# Patient Record
Sex: MALE | Race: WHITE
[De-identification: names, ages, dates, MRNs, and addresses within clinical notes are randomized per-mention and may not be internally consistent; named-entity substitution may affect disease eponyms.]

---

## 2018-10-25 ENCOUNTER — HOSPITAL ENCOUNTER (OUTPATIENT)
Dept: HOSPITAL 47 - RADCTMAIN | Age: 63
Discharge: HOME | End: 2018-10-25
Attending: NURSE PRACTITIONER
Payer: OTHER GOVERNMENT

## 2018-10-25 DIAGNOSIS — I71.2: Primary | ICD-10-CM

## 2018-10-25 LAB — BUN SERPL-SCNC: 15 MG/DL (ref 9–20)

## 2018-10-25 PROCEDURE — 36415 COLL VENOUS BLD VENIPUNCTURE: CPT

## 2018-10-25 PROCEDURE — 84520 ASSAY OF UREA NITROGEN: CPT

## 2018-10-25 PROCEDURE — 71275 CT ANGIOGRAPHY CHEST: CPT

## 2018-10-25 PROCEDURE — 82565 ASSAY OF CREATININE: CPT

## 2018-10-25 NOTE — CT
EXAMINATION TYPE: CT angio chest

 

DATE OF EXAM: 10/25/2018

 

COMPARISON: None

 

HISTORY: Thoracic aortic aneurysm.

 

CT DLP: 268 mGycm

 

CONTRAST: 

CTA thoracic aorta with 3-D reconstruction is performed and with IV Contrast, patient injected with 1
00 mL of Isovue 370.

 

Contrast CTA of the thoracic aorta was performed from the lung apex through the upper abdomen.  3D re
construction imaging obtained at a separate workstation.  

 

CT Chest:

 

THORACIC AORTA: No evidence for thoracic aortic aneurysm. Mild atheromatous changes seen.  There is n
o evidence for dissection or periaortic collection.  

 

LUNGS: The lungs are clear and free of infiltrate or atelectasis.  No pulmonary nodule or mass is det
ected.  No pleural effusion or CT evidence of interstitial lung disease.

 

MEDIASTINUM:   No evidence for mediastinal hematoma.  The heart is not enlarged.  No evidence for med
iastinal mass or adenopathy.

 

HILAR STRUCTURES: No evidence for mass.  No hilar adenopathy is appreciated.

 

OTHER: No significant abnormality. 

 

IMPRESSION-

 

1. No evidence for thoracic aortic.

## 2020-01-07 ENCOUNTER — HOSPITAL ENCOUNTER (OUTPATIENT)
Dept: HOSPITAL 47 - RADCTMAIN | Age: 65
Discharge: HOME | End: 2020-01-07
Attending: CLINIC/CENTER
Payer: OTHER GOVERNMENT

## 2020-01-07 DIAGNOSIS — Z12.2: Primary | ICD-10-CM

## 2020-01-07 DIAGNOSIS — F17.210: ICD-10-CM

## 2020-01-07 NOTE — CTL
EXAMINATION TYPE:  CT Low Dose Lung

 

DATE OF EXAM ORDERED: 1/7/2020

 

HISTORY: . Lung cancer screening

 

CT DLP: 100 mGycm

CT CTDI: 2.9 mGy

Automated exposure control for dose reduction was used.

 

SCREENING VISIT: Initial

 

COMPARISON: CTA 10/25/2018

 

TECHNIQUE: Low dose computed tomography scan was performed through the chest at 1 mm thick sections a
nd reconstructed images in the coronal plane at 1 mm thick sections.

 

CT DIAGNOSTIC QUALITY: Satisfactory

 

FINDINGS:

LUNG NODULES: None.

 

LUNGS:

COPD: Severity: None

Fibrosis: Severity: None

Lymph nodes: None

Other findings: None

 

RIGHT PLEURAL SPACE:

Effusion: None

Calcification: None

Thickening: None

Pneumothorax: None

 

LEFT PLEURAL SPACE:

Effusion: None

Calcification: None

Thickening: None

Pneumothorax: None

 

HEART:  

Heart Size: Normal

Coronary calcification: Mild

Pericardial effusion: None

 

OTHER FINDINGS:

Upper abdomen: Postsurgical changes right nephrectomy.

Bony thorax: Normal

Supraclavicular region: Normal

Other: Ascending thoracic aorta at the level the main pulmonary artery measures 3.7 cm.  The main pul
monary artery at the bifurcation measures 2.6 cm.

 

IMPRESSION: 

1. Negative CT chest

 

FOLLOW UP CT CHEST RECOMMENDATION: Low dose screening in 1 year per protocol

CT LUNG RAD: 1

## 2021-04-13 ENCOUNTER — HOSPITAL ENCOUNTER (EMERGENCY)
Dept: HOSPITAL 47 - EC | Age: 66
Discharge: HOME | End: 2021-04-13
Payer: OTHER GOVERNMENT

## 2021-04-13 VITALS — HEART RATE: 79 BPM | RESPIRATION RATE: 18 BRPM | SYSTOLIC BLOOD PRESSURE: 141 MMHG | DIASTOLIC BLOOD PRESSURE: 79 MMHG

## 2021-04-13 VITALS — TEMPERATURE: 97.8 F

## 2021-04-13 DIAGNOSIS — E78.5: ICD-10-CM

## 2021-04-13 DIAGNOSIS — L03.211: ICD-10-CM

## 2021-04-13 DIAGNOSIS — I10: ICD-10-CM

## 2021-04-13 DIAGNOSIS — F17.200: ICD-10-CM

## 2021-04-13 DIAGNOSIS — K04.7: Primary | ICD-10-CM

## 2021-04-13 PROCEDURE — 99283 EMERGENCY DEPT VISIT LOW MDM: CPT

## 2021-04-13 NOTE — ED
General Adult HPI





- General


Chief complaint: Skin/Abscess/Foreign Body


Stated complaint: R side cheek pus inside mouth


Time Seen by Provider: 04/13/21 16:43


Source: patient, RN notes reviewed, old records reviewed


Mode of arrival: ambulatory


Limitations: no limitations





- History of Present Illness


Initial comments: 





66-year-old male with right facial swelling, tooth pain.  Symptoms haven't 

present for 2 days.  Patient has no systemic symptoms.  No fever.  He's had a 

similar episode to this in the past which responded well to antibiotics.  Is 

able to eat and drink.  He did report some purulent drainage from his right 

upper molar.  Patient denies difficulty breathing or difficulty eating.





- Related Data


                                  Previous Rx's











 Medication  Instructions  Recorded


 


Amoxicillin/Potassium Clav 1 tab PO Q12HR 14 Days #28 tab 04/13/21





[Augmentin 875-125 Tablet]  











                                    Allergies











Allergy/AdvReac Type Severity Reaction Status Date / Time


 


hydromorphone [From Dilaudid] Allergy  Anaphylaxis Verified 04/13/21 13:41














Review of Systems


ROS Statement: 


Those systems with pertinent positive or pertinent negative responses have been 

documented in the HPI.





ROS Other: All systems not noted in ROS Statement are negative.





Past Medical History


Past Medical History: Hyperlipidemia, Hypertension, Renal Disease


Additional Past Medical History / Comment(s): chronic neck and back pain


History of Any Multi-Drug Resistant Organisms: None Reported


Additional Past Surgical History / Comment(s): kidney removed


Past Psychological History: No Psychological Hx Reported


Smoking Status: Current every day smoker


Past Alcohol Use History: Occasional


Past Drug Use History: None Reported





General Exam


Limitations: no limitations


General appearance: alert, in no apparent distress


Head exam: Present: atraumatic, normocephalic


Eye exam: Present: normal appearance, PERRL


ENT exam: Present: other (Minimal right upper or facial swelling.  No fluctuance

or induration.  There is a molar on the upper right with a abscess which is 

freely draining.  There is tenderness to percussion over this tooth.)


Neck exam: Present: normal inspection.  Absent: tenderness, meningismus


Respiratory exam: Present: normal lung sounds bilaterally.  Absent: respiratory 

distress


Cardiovascular Exam: Present: regular rate, normal rhythm


GI/Abdominal exam: Present: soft.  Absent: distended, tenderness


Extremities exam: Present: normal inspection, normal capillary refill.  Absent: 

pedal edema


Neurological exam: Present: alert, oriented X3, CN II-XII intact.  Absent: motor

sensory deficit


Skin exam: Present: warm, dry, intact.  Absent: cyanosis, diaphoretic





Course





                                   Vital Signs











  04/13/21





  13:37


 


Temperature 97.8 F


 


Pulse Rate 91


 


Respiratory 20





Rate 


 


Blood Pressure 150/98


 


O2 Sat by Pulse 96





Oximetry 














Medical Decision Making





- Medical Decision Making





66-year-old male with right upper molar tooth abscess, facial swelling.  Patient

is well-appearing, no trismus.  No fever.  He does have a freely draining apical

abscess in the right upper molar.  There is some facial cellulitis, no drainable

abscess within the soft tissue of the cheek itself.  Patient started on 

Augmentin and he will follow-up with his dentist.  He is given strict return 

parameters.





Disposition


Clinical Impression: 


 Abscessed tooth, Facial cellulitis





Disposition: HOME SELF-CARE


Condition: Good


Instructions (If sedation given, give patient instructions):  Dental Abscess 

(ED)


Additional Instructions: 


Please return with development of fever, worsening symptoms.  Please follow up 

with your dentist.


Prescriptions: 


Amoxicillin/Potassium Clav [Augmentin 875-125 Tablet] 1 tab PO Q12HR 14 Days #28

tab


Is patient prescribed a controlled substance at d/c from ED?: No


Referrals: 


[facility] [Primary Care Provider] - 1-2 days


Time of Disposition: 16:53

## 2021-08-06 ENCOUNTER — HOSPITAL ENCOUNTER (OUTPATIENT)
Dept: HOSPITAL 47 - RADCTMAIN | Age: 66
Discharge: HOME | End: 2021-08-06
Attending: CLINIC/CENTER
Payer: OTHER GOVERNMENT

## 2021-08-06 DIAGNOSIS — J43.9: ICD-10-CM

## 2021-08-06 DIAGNOSIS — J44.9: ICD-10-CM

## 2021-08-06 DIAGNOSIS — Z12.2: Primary | ICD-10-CM

## 2021-08-06 PROCEDURE — 71271 CT THORAX LUNG CANCER SCR C-: CPT

## 2021-08-06 NOTE — CTL
EXAMINATION TYPE:  CT Low Dose Lung

 

DATE OF EXAM ORDERED: 8/6/2021

 

HISTORY: 66-year-old male Personal history of tobacco use. Lung cancer screening

 

CT DLP: 101.4 mGycm

CT CTDI: 2.8 mGy

Automated exposure control for dose reduction was used.

 

SCREENING VISIT: One and a half years from prior screening

 

COMPARISON: 1/7/2020

 

TECHNIQUE: Low dose computed tomography scan was performed through the chest with coronal and sagitta
l reconstructions.

 

CT DIAGNOSTIC QUALITY: Satisfactory

 

FINDINGS:

Heart normal size without pericardial effusion. Scattered three-vessel coronary calcifications are pr
esent.

 

Ascending aorta ectatic and 3.8 cm, unchanged. Bovine configuration to the aortic arch. There is dire
ct takeoff of the left vertebral artery directly from the aortic arch. Ectatic lower descending thora
cic aorta 2.6 cm.

 

Stable scattered nonenlarged mediastinal lymph nodes.

 

Mild centrilobular emphysema.  Mild diffuse bronchial wall thickening. There is strandy atelectasis o
r scarring in the lower lungs. Some scattered mild paraseptal emphysema also noted. No consolidation 
or pleural effusion.

 

Stable 2 mm subpleural pulmonary nodule anterior right upper lobe, axial image 60. 

Some focal endobronchial opacification within the posterior segment right upper lobe bronchus, axial 
image 106 measuring 7 mm.

 

No consolidation or pleural effusion.

 

Surgical clips at the right nephrectomy bed.

 

Bones: Stable mild degenerative disc disease and endplate Schmorl's nodes lower thoracic spine.

 

 

 

 

 

IMPRESSION: 

1. Lungs RADS Category 4A ( Probably suspicious, 5-15% chance of malignancy). New 7 mm endobronchial 
nodule posterior segment right upper lobe bronchus. This may represent an area of mucous plugging. Sh
ort interval follow-up recommended to reassess and exclude a small early endobronchial lesion.

 

2. COPD with mild emphysema.

 

 

 

CT LUNG RAD AND CT CHEST RECOMMENDATION: Lung-Rad 4A Suspicious: Follow-up 3 month LDCT or PET/CT may
 be used when there is a > 8 mm solid component.

## 2021-11-02 ENCOUNTER — HOSPITAL ENCOUNTER (OUTPATIENT)
Dept: HOSPITAL 47 - RADCTMAIN | Age: 66
Discharge: HOME | End: 2021-11-02
Attending: INTERNAL MEDICINE
Payer: OTHER GOVERNMENT

## 2021-11-02 DIAGNOSIS — R91.8: Primary | ICD-10-CM

## 2021-11-02 LAB — BUN SERPL-SCNC: 16 MG/DL (ref 9–20)

## 2021-11-02 PROCEDURE — 36415 COLL VENOUS BLD VENIPUNCTURE: CPT

## 2021-11-02 PROCEDURE — 82565 ASSAY OF CREATININE: CPT

## 2021-11-02 PROCEDURE — 71260 CT THORAX DX C+: CPT

## 2021-11-02 PROCEDURE — 84520 ASSAY OF UREA NITROGEN: CPT

## 2021-11-02 NOTE — CT
EXAMINATION TYPE: CT chest w con

 

DATE OF EXAM: 11/2/2021

 

COMPARISON: Low-dose lung screening CT August 6, 2021 and older CTs

 

HISTORY: Previous abnormal exam lung fields

 

CT DLP: 309.6 mGycm.   Automated Exposure Control for Dose Reduction was Utilized.

 

TECHNIQUE:  CT scan of the thorax is performed following with IV Contrast, patient injected with 100 
ml mL of Isovue 300.  

 

FINDINGS:

 

LUNGS: Mild underlying emphysematous changes redemonstrated.  Interval clearance of 7 mm nodular opac
ity posterior right upper lung bronchus axial image 25 likely reflecting mucous plugging given the in
terval complete resolution. No new greater than 5 mm pulmonary nodules or masses. Mild bibasilar line
ar scarring and/or atelectasis. There is no pleural effusion or pneumothorax seen.  The tracheobronch
ial tree is patent.

 

MEDIASTINUM: There are no greater than 1 cm hilar or mediastinal lymph nodes.   No cardiomegaly or pe
ricardial effusion is seen.  4 vessel origin from aortic arch which is normal variant.

 

OTHER: Right kidney suspected surgically absent. Exaggerated thoracic kyphosis with mild multilevel s
purring.

 

IMPRESSION: Interval resolution of 7 mm endobronchial lesion consistent with resolved mucous plugging
. Mild emphysematous change without acute pulmonary process or suspicious greater than 5 mm pulmonary
 nodule.

## 2022-03-23 ENCOUNTER — HOSPITAL ENCOUNTER (EMERGENCY)
Dept: HOSPITAL 47 - EC | Age: 67
Discharge: HOME | End: 2022-03-23
Payer: MEDICARE

## 2022-03-23 VITALS
TEMPERATURE: 97.2 F | HEART RATE: 76 BPM | SYSTOLIC BLOOD PRESSURE: 175 MMHG | DIASTOLIC BLOOD PRESSURE: 96 MMHG | RESPIRATION RATE: 18 BRPM

## 2022-03-23 DIAGNOSIS — Z88.5: ICD-10-CM

## 2022-03-23 DIAGNOSIS — L30.9: Primary | ICD-10-CM

## 2022-03-23 DIAGNOSIS — I10: ICD-10-CM

## 2022-03-23 DIAGNOSIS — F17.200: ICD-10-CM

## 2022-03-23 PROCEDURE — 99282 EMERGENCY DEPT VISIT SF MDM: CPT

## 2022-03-23 NOTE — ED
General Adult HPI





- General


Chief complaint: Skin/Abscess/Foreign Body


Stated complaint: Rash


Time Seen by Provider: 03/23/22 09:13


Source: patient, RN notes reviewed


Mode of arrival: ambulatory


Limitations: no limitations





- History of Present Illness


Initial comments: 


66-year-old male  presented to emergency department with chief complaint for 

rash.  Patient states that the rash started several days ago.  He states it 

started in his legs has spread diffusely states is very itchy worse when he gets

warmer lays in his bed.  He states is small bumps over his extremities, hands 

patient states that he's been taking Benadryl with no relief he was given 

steroid cream which has not helped he states it's too diffuse.








- Related Data


                                  Previous Rx's











 Medication  Instructions  Recorded


 


Amoxicillin/Potassium Clav 1 tab PO Q12HR 14 Days #28 tab 04/13/21





[Augmentin 875-125 Tablet]  


 


Permethrin 5% Cream [Elimite] 1 applic TOPICAL ONCE #60 gram 03/23/22


 


hydrOXYzine HCL [Atarax] 25 mg PO TID PRN #20 tab 03/23/22


 


predniSONE 50 mg PO DAILY #5 tab 03/23/22











                                    Allergies











Allergy/AdvReac Type Severity Reaction Status Date / Time


 


hydromorphone [From Dilaudid] Allergy  Anaphylaxis Verified 03/23/22 09:12














Review of Systems


ROS Statement: 


Those systems with pertinent positive or pertinent negative responses have been 

documented in the HPI.





ROS Other: All systems not noted in ROS Statement are negative.





Past Medical History


Past Medical History: Hyperlipidemia, Hypertension, Renal Disease


Additional Past Medical History / Comment(s): chronic neck and back pain


History of Any Multi-Drug Resistant Organisms: None Reported


Additional Past Surgical History / Comment(s): kidney removed


Past Psychological History: No Psychological Hx Reported


Smoking Status: Current every day smoker


Past Alcohol Use History: Occasional


Past Drug Use History: None Reported





General Exam


Limitations: no limitations


General appearance: alert, in no apparent distress


Head exam: Present: atraumatic, normocephalic, normal inspection


Eye exam: Present: normal appearance, PERRL, EOMI.  Absent: scleral icterus, 

conjunctival injection, periorbital swelling


ENT exam: Present: normal exam, normal oropharynx, mucous membranes moist


Neck exam: Present: normal inspection, full ROM.  Absent: tenderness, 

meningismus, lymphadenopathy


Respiratory exam: Present: normal lung sounds bilaterally.  Absent: respiratory 

distress, wheezes, rales, rhonchi, stridor


Cardiovascular Exam: Present: regular rate, normal rhythm, normal heart sounds. 

Absent: systolic murmur, diastolic murmur, rubs, gallop, clicks


Neurological exam: Present: alert, oriented X3, CN II-XII intact


Skin exam: Present: warm, dry, intact, normal color, rash (Diffuse over his 

torso, extremities small erythematous slightly raised papules, excoriations 

diffusely)





Course





                                   Vital Signs











  03/23/22





  09:08


 


Temperature 97.2 F L


 


Pulse Rate 76


 


Respiratory 18





Rate 


 


Blood Pressure 175/96


 


O2 Sat by Pulse 96





Oximetry 














Medical Decision Making





- Medical Decision Making





Patient has some underlying dermatitis and may root related to ALLERGIC though 

patient is concerned about scabies.  Patient provided scabies treatment, will be

given steroids, Atarax.  Return parameters were discussed.





Disposition


Clinical Impression: 


 Dermatitis





Disposition: HOME SELF-CARE


Condition: Stable


Instructions (If sedation given, give patient instructions):  Dermatitis (ED)


Additional Instructions: 


Please return to the Emergency Department if symptoms worsen or any other 

concerns.


Prescriptions: 


hydrOXYzine HCL [Atarax] 25 mg PO TID PRN #20 tab


 PRN Reason: Itching


Permethrin 5% Cream [Elimite] 1 applic TOPICAL ONCE #60 gram


predniSONE 50 mg PO DAILY #5 tab


Is patient prescribed a controlled substance at d/c from ED?: No


Referrals: 


[facility] [Primary Care Provider] - 1-2 days


Time of Disposition: 09:49

## 2022-03-29 ENCOUNTER — HOSPITAL ENCOUNTER (EMERGENCY)
Dept: HOSPITAL 47 - EC | Age: 67
Discharge: HOME | End: 2022-03-29
Payer: MEDICARE

## 2022-03-29 VITALS — TEMPERATURE: 97.1 F | RESPIRATION RATE: 18 BRPM | HEART RATE: 86 BPM

## 2022-03-29 VITALS — SYSTOLIC BLOOD PRESSURE: 174 MMHG | DIASTOLIC BLOOD PRESSURE: 104 MMHG

## 2022-03-29 DIAGNOSIS — E78.5: ICD-10-CM

## 2022-03-29 DIAGNOSIS — F17.200: ICD-10-CM

## 2022-03-29 DIAGNOSIS — B86: Primary | ICD-10-CM

## 2022-03-29 DIAGNOSIS — I10: ICD-10-CM

## 2022-03-29 DIAGNOSIS — Z79.899: ICD-10-CM

## 2022-03-29 PROCEDURE — 99282 EMERGENCY DEPT VISIT SF MDM: CPT

## 2022-03-29 NOTE — ED
General Adult HPI





- General


Chief complaint: Skin/Abscess/Foreign Body


Stated complaint: Revisit/Rash


Time Seen by Provider: 03/29/22 09:17


Source: patient, RN notes reviewed, old records reviewed


Mode of arrival: ambulatory





- History of Present Illness


Initial comments: 





Patient is a 66-year-old male who returns to the emergency department due to a 

rash which she suspects is scabies.  Was seen last week for similar symptoms.  

Medications he was sent home on did help, maybe more manageable.  He states he 

did wash most of the clothing and linens in his house but not all the furniture 

carpet.  Is uncertain where it came from.  States it is worse today.  He ran out

of the medications on Sunday.  He presents seeking additional medications at 

this time.  Has not yet followed up with his PCP.  Denies any fevers, cough, 

chest pain, abdominal pain, nausea, vomiting.  His no difficulty breathing or 

swallowing.  Does have a history of hypertension take his medications this mor

nayla.  States that the rash is over his chest and back.  It is also on his legs.

 States it is very itchy.  No fevers or sick contacts.  No other acute 

complaints at this time.





- Related Data


                                  Previous Rx's











 Medication  Instructions  Recorded


 


Amoxicillin/Potassium Clav 1 tab PO Q12HR 14 Days #28 tab 04/13/21





[Augmentin 875-125 Tablet]  


 


Permethrin 5% Cream [Elimite] 1 applic TOPICAL ONCE #60 gram 03/23/22


 


hydrOXYzine HCL [Atarax] 25 mg PO TID PRN #20 tab 03/23/22


 


predniSONE 50 mg PO DAILY #5 tab 03/23/22


 


Permethrin 5% Cream [Elimite] 1 applic TOPICAL ONCE #60 gm 03/29/22


 


hydrOXYzine HCL [Atarax] 25 mg PO TID PRN 14 Days #42 tab 03/29/22


 


predniSONE [Deltasone] 40 mg PO DAILY 5 Days #10 tab 03/29/22











                                    Allergies











Allergy/AdvReac Type Severity Reaction Status Date / Time


 


hydromorphone [From Dilaudid] Allergy  Anaphylaxis Verified 03/29/22 09:17














Review of Systems


ROS Statement: 


Those systems with pertinent positive or pertinent negative responses have been 

documented in the HPI.


Review of Systems:


CONST: Denies fever 


EYES: Denies blurry vision 


ENT: Denies nasal congestion  


C/V:  Denies Chest pain


RESP: Denies shortness of breath 


GI: Denies abdominal pain 


: Denies dysuria  


SKIN: Endorses rash


MSK: Denies joint pain.


NEURO: Denies headache 


ROS Other: All systems not noted in ROS Statement are negative.





Past Medical History


Past Medical History: Hyperlipidemia, Hypertension, Renal Disease


Additional Past Medical History / Comment(s): chronic neck and back pain


History of Any Multi-Drug Resistant Organisms: None Reported


Additional Past Surgical History / Comment(s): kidney removed


Past Psychological History: No Psychological Hx Reported


Smoking Status: Current every day smoker


Past Alcohol Use History: Occasional


Past Drug Use History: None Reported





General Exam





- General Exam Comments


Initial Comments: 





General: Appears in no acute distress.


HEAD:  Normal with no signs of head trauma.


EYES: EOMI


ENT: Hearing grossly intact.  No stridor.  No intraoral swelling.


RESPIRATORY:.  Breath sounds bilaterally.  No wheezing.


C/V: Regular rate and rhythm.  S1 and S2 auscultated.  Peripheral pulses 2+ and 

intact.


ABD:  Abd is soft, nontender, nondistended


EXT: No obvious deformity.


SKIN: Patient does have a maculopapular lesions with excoriations over his 

trunk, extremities.  There appeared to be scabies-like in nature.  No induration

or erythema to suggest cellulitis or skin infection.  Nondermatomal 

distribution.


NEURO: Alert and oriented 4.  No focal deficits.





Course


                                   Vital Signs











  03/29/22 03/29/22





  09:15 09:28


 


Temperature 97.1 F L 


 


Pulse Rate 86 


 


Respiratory 18 





Rate  


 


Blood Pressure 196/98 174/104


 


O2 Sat by Pulse 98 99





Oximetry  














Medical Decision Making





- Medical Decision Making





Based on the patient's presentation and exam, I do believe he is experiencing 

continuation of his scabies-like rash.  He is requesting refills on medications 

which I will provide.  I also discussed with him the importance of following up 

with his PCP, as well as washing all clothing as well as all furniture and 

carpeting.  I also recommended that all members of the house do the same.  He 

was in agreement this plan.  He'll be provided with a dose of Atarax and 

prednisone here as well as a prescription for Atarax, prednisone, permethrin.  

He'll be given a refill for the permethrin as well.  He was in agreement with 

this plan.  I instructed him to follow up with his PCP within the next few days.

 I do not believe that laboratory studies or imaging are required at this time. 

Patient is stating that the cream helped significantly, and that symptoms all 

returned when he ran out of the cream on Sunday.





Patient was hypertensive initially upon arrival, however afterwards did have 

improvement in his blood pressure.  He has no symptoms and therefore is 

experiencing asymptomatic hypertension.  Did not miss any doses of his 

antihypertensive medications.  I counseled him on the importance of taking his 

blood pressure medications and he was in agreement.





I answered all questions that he had.  Patient will be discharged home in fair 

condition.





I will provide the patient with a prescription for prednisone, Atarax, 

permethrin cream. I instructed the patient to follow up with their PCP in the 

next 3 days.  I explained that the patient should return to the emergency 

department if they experience any worsening symptoms. Strict return precautions 

were discussed with the patient. The patient expressed understanding of these 

instructions. I answered all questions that the patient had. The patient was 

discharged home in fair condition with their prescriptions and follow up 

information.





Disposition


Clinical Impression: 


 Rash, Scabies





Disposition: HOME SELF-CARE


Condition: Fair


Instructions (If sedation given, give patient instructions):  Scabies (ED)


Prescriptions: 


hydrOXYzine HCL [Atarax] 25 mg PO TID PRN 14 Days #42 tab


 PRN Reason: Itching


predniSONE [Deltasone] 40 mg PO DAILY 5 Days #10 tab


Permethrin 5% Cream [Elimite] 1 applic TOPICAL ONCE #60 gm


Is patient prescribed a controlled substance at d/c from ED?: No


Referrals: 


[facility] [Primary Care Provider] - 1-2 days

## 2022-04-21 ENCOUNTER — HOSPITAL ENCOUNTER (EMERGENCY)
Dept: HOSPITAL 47 - EC | Age: 67
Discharge: HOME | End: 2022-04-21
Payer: MEDICARE

## 2022-04-21 VITALS — SYSTOLIC BLOOD PRESSURE: 136 MMHG | HEART RATE: 82 BPM | DIASTOLIC BLOOD PRESSURE: 88 MMHG

## 2022-04-21 VITALS — RESPIRATION RATE: 18 BRPM | TEMPERATURE: 97.3 F

## 2022-04-21 DIAGNOSIS — E78.5: ICD-10-CM

## 2022-04-21 DIAGNOSIS — I10: ICD-10-CM

## 2022-04-21 DIAGNOSIS — Z86.19: ICD-10-CM

## 2022-04-21 DIAGNOSIS — F17.200: ICD-10-CM

## 2022-04-21 DIAGNOSIS — Z88.5: ICD-10-CM

## 2022-04-21 DIAGNOSIS — R21: Primary | ICD-10-CM

## 2022-04-21 PROCEDURE — 99282 EMERGENCY DEPT VISIT SF MDM: CPT

## 2022-04-21 NOTE — ED
General Adult HPI





- General


Chief complaint: Recheck/Abnormal Lab/Rx


Stated complaint: Revisit-skin irritation


Time Seen by Provider: 04/21/22 16:51


Source: patient


Mode of arrival: ambulatory


Limitations: no limitations





- History of Present Illness


Initial comments: 





This 67-year-old male who was initially diagnosed with scabies presents 

emergency Department with itching rash 1 month.  Patient states he was seen 

here 2 times at the end of March and diagnosed with scabies and given medication

and states the medication does seem to help until he runs out of it and then 

symptoms began to come back.  Patient states he is still having intense itching 

when he does not have the hydroxyzine at home.  Patient states he did try to 

follow up with his primary care provider, however they did not see scabies 

infections in the office.  Patient here requesting more medications and 

dermatology follow-up.  Patient states the rash on his body has not changed much

over the last month, however he states that it is more itchy now that he does 

not have any cream or itching medication.  Patient states he did used the 

permethrin cream as directed along with taking prednisone and hydroxyzine.  

Patient states he has washed all clothes and recently began to sleep on a 

different mattress that was on his bedroom.  Patient denies having any bedbugs 

or any other bugs in his house. Patient denies any chest pain, shortness of 

breath, abdominal pain, nausea, vomiting, change in vision, headaches, 

dizziness, trouble swallowing, change in bowel or bladder, change in appetite, 

lightheadedness, dizziness.





- Related Data


                                  Previous Rx's











 Medication  Instructions  Recorded


 


Amoxicillin/Potassium Clav 1 tab PO Q12HR 14 Days #28 tab 04/13/21





[Augmentin 875-125 Tablet]  


 


Permethrin 5% Cream [Elimite] 1 applic TOPICAL ONCE #60 gram 03/23/22


 


hydrOXYzine HCL [Atarax] 25 mg PO TID PRN #20 tab 03/23/22


 


predniSONE 50 mg PO DAILY #5 tab 03/23/22


 


Permethrin 5% Cream [Elimite] 1 applic TOPICAL ONCE #60 gm 03/29/22


 


hydrOXYzine HCL [Atarax] 25 mg PO TID PRN 14 Days #42 tab 03/29/22


 


predniSONE [Deltasone] 40 mg PO DAILY 5 Days #10 tab 03/29/22


 


Permethrin 5% Cream [Elimite] 1 applic TOPICAL ONCE #60 gm 04/21/22


 


hydrOXYzine HCL [Atarax] 25 mg PO TID PRN #42 tab 04/21/22


 


predniSONE 50 mg PO DAILY #5 tab 04/21/22











                                    Allergies











Allergy/AdvReac Type Severity Reaction Status Date / Time


 


hydromorphone [From Dilaudid] Allergy  Anaphylaxis Verified 04/21/22 15:10














Review of Systems


ROS Statement: 


Those systems with pertinent positive or pertinent negative responses have been 

documented in the HPI.





ROS Other: All systems not noted in ROS Statement are negative.





Past Medical History


Past Medical History: Hyperlipidemia, Hypertension, Renal Disease


Additional Past Medical History / Comment(s): chronic neck and back pain


History of Any Multi-Drug Resistant Organisms: None Reported


Additional Past Surgical History / Comment(s): kidney removed


Past Psychological History: No Psychological Hx Reported


Smoking Status: Current every day smoker


Past Alcohol Use History: Occasional


Past Drug Use History: None Reported





General Exam


Limitations: no limitations


General appearance: alert, in no apparent distress


Head exam: Present: atraumatic, normocephalic, normal inspection


Eye exam: Present: normal appearance, PERRL, EOMI.  Absent: scleral icterus, 

conjunctival injection, periorbital swelling


Pupils: Present: normal accommodation


ENT exam: Present: normal exam, mucous membranes moist


Neck exam: Present: normal inspection, full ROM.  Absent: tenderness, 

meningismus, lymphadenopathy


Respiratory exam: Present: normal lung sounds bilaterally.  Absent: respiratory 

distress, wheezes, rales, rhonchi, stridor


Cardiovascular Exam: Present: regular rate, normal rhythm, normal heart sounds. 

Absent: systolic murmur, diastolic murmur, rubs, gallop, clicks


GI/Abdominal exam: Present: soft, normal bowel sounds.  Absent: distended, te

nderness, guarding, rebound, rigid


Extremities exam: Present: normal inspection (Patient with maculopapular patches

over his chest, abdomen, back and extremities that exhibit excoriations.  

Patches do deisy. No induration or warmth to suggest cellulitis or infection.),

full ROM, normal capillary refill.  Absent: tenderness, pedal edema, joint 

swelling, calf tenderness


Back exam: Present: normal inspection (Patient with maculopapular patches over 

his chest, abdomen, back and extremities that exhibit excoriations.  Patches do 

deisy when pressed. No induration or warmth to suggest cellulitis or 

infection.), full ROM.  Absent: CVA tenderness (R), CVA tenderness (L), 

paraspinal tenderness, vertebral tenderness


Neurological exam: Present: alert, oriented X3, CN II-XII intact


Psychiatric exam: Present: normal affect, normal mood


Skin exam: Present: warm, dry, intact, normal color, rash (Patient with 

maculopapular patches over his chest, abdomen, back and extremities with 

excoriations.  Patches do deisy to palpation. No induration or warmth to 

suggest cellulitis or infection.)





Course


                                   Vital Signs











  04/21/22





  15:06


 


Temperature 97.3 F L


 


Pulse Rate 91


 


Respiratory 18





Rate 


 


Blood Pressure 135/92


 


O2 Sat by Pulse 96





Oximetry 














Medical Decision Making





- Medical Decision Making





This 67-year-old male was been diagnosed with scabies in the last month presents

to the emergency department with maculopapular patches over his chest, abdomen, 

back and extremities that exhibit excoriations that he states has been present 

for about a month.  Patient given hydroxyzine, Pepcid and prednisone in the 

emergency department.  Prescription for hydroxyzine, prednisone and permethrin 

cream prescribed to patient.  Follow up contact information with dermatologist 

provided to patient which I instructed him to call tomorrow morning and schedule

earliest appointment.  Instructed patient to follow-up with his primary care 

provider.  Patient verbally agreed to plan and stated he would call the 

dermatologist tomorrow morning when they open.  Strict return precautions were 

discussed.  Patient verbally.  Pain.  Patient sent home in stable condition.  

Case discussed in detail with my attending, Dr. Carrington who also saw and evaluated 

patient.





Disposition


Clinical Impression: 


 History of scabies, Rash





Disposition: HOME SELF-CARE


Condition: Stable


Instructions (If sedation given, give patient instructions):  Scabies (ED)


Additional Instructions: 


Please take medications as directed.  Call dermatologist tomorrow morning to 

schedule an appointment as soon as possible.  Return to the emergency department

with any new, worsening, or concerning symptoms.  Follow-up with primary care in

next 1-2 days.


Prescriptions: 


hydrOXYzine HCL [Atarax] 25 mg PO TID PRN #42 tab


 PRN Reason: Itching


Permethrin 5% Cream [Elimite] 1 applic TOPICAL ONCE #60 gm


predniSONE 50 mg PO DAILY #5 tab


Is patient prescribed a controlled substance at d/c from ED?: No


Referrals: 


[facility] [Primary Care Provider] - 1-2 days


Dick Pedro MD [STAFF PHYSICIAN] - 1-2 days


Aniya Pedro MD [STAFF PHYSICIAN] - 1-2 days


Nicole Ramires MD [REFERRING] - 1-2 days


Time of Disposition: 18:02

## 2022-12-01 ENCOUNTER — HOSPITAL ENCOUNTER (EMERGENCY)
Dept: HOSPITAL 47 - EC | Age: 67
Discharge: HOME | End: 2022-12-01
Payer: OTHER GOVERNMENT

## 2022-12-01 VITALS — HEART RATE: 75 BPM | DIASTOLIC BLOOD PRESSURE: 97 MMHG | RESPIRATION RATE: 18 BRPM | SYSTOLIC BLOOD PRESSURE: 138 MMHG

## 2022-12-01 VITALS — TEMPERATURE: 98.2 F

## 2022-12-01 DIAGNOSIS — F17.200: ICD-10-CM

## 2022-12-01 DIAGNOSIS — L30.8: Primary | ICD-10-CM

## 2022-12-01 DIAGNOSIS — N18.9: ICD-10-CM

## 2022-12-01 DIAGNOSIS — Z88.5: ICD-10-CM

## 2022-12-01 DIAGNOSIS — Z79.899: ICD-10-CM

## 2022-12-01 DIAGNOSIS — I12.9: ICD-10-CM

## 2022-12-01 PROCEDURE — 96372 THER/PROPH/DIAG INJ SC/IM: CPT

## 2022-12-01 PROCEDURE — 99282 EMERGENCY DEPT VISIT SF MDM: CPT

## 2022-12-01 NOTE — ED
General Adult HPI





- General


Chief complaint: Skin/Abscess/Foreign Body


Stated complaint: scabies


Time Seen by Provider: 12/01/22 12:28


Source: patient


Mode of arrival: ambulatory


Limitations: no limitations





- History of Present Illness


Initial comments: 


[age]-year-old male who presents to the emergency department with concern for 

scabies infection.  Patient reports a rash mainly over his back that began about

a week and a half ago.  Patient states the rash has spread down his back and to 

a lesser extent, his legs.  The rash is a chief.  It is not painful.  He cannot 

recall any new skin products, detergents, soaps.  He denies recent contact with 

trees and other greenery.  Patient is very adamant that this is a scabies 

infection.  He does report a scabies infection last year.  I did review this 

documentation.  At the time patient was treated for possible scabies.  He did 

return to the emergency department twice that month for continued rash.  Patient

states he followed with a dermatologist who diagnosed him with a "dermatologic 

disease from the scabies".  Patient denies fever, chills, upper respiratory 

symptoms, nausea, vomiting, shortness of breath, throat swelling.








- Related Data


                                  Previous Rx's











 Medication  Instructions  Recorded


 


Amoxicillin/Potassium Clav 1 tab PO Q12HR 14 Days #28 tab 04/13/21





[Augmentin 875-125 Tablet]  


 


Permethrin 5% Cream [Elimite] 1 applic TOPICAL ONCE #60 gram 03/23/22


 


hydrOXYzine HCL [Atarax] 25 mg PO TID PRN #20 tab 03/23/22


 


predniSONE 50 mg PO DAILY #5 tab 03/23/22


 


Permethrin 5% Cream [Elimite] 1 applic TOPICAL ONCE #60 gm 03/29/22


 


hydrOXYzine HCL [Atarax] 25 mg PO TID PRN 14 Days #42 tab 03/29/22


 


predniSONE [Deltasone] 40 mg PO DAILY 5 Days #10 tab 03/29/22


 


Permethrin 5% Cream [Elimite] 1 applic TOPICAL ONCE #60 gm 04/21/22


 


hydrOXYzine HCL [Atarax] 25 mg PO TID PRN #42 tab 04/21/22


 


predniSONE 50 mg PO DAILY #5 tab 04/21/22


 


Permethrin 5% Cream [Elimite] 1 applic TOPICAL ONCE #60 gram 12/01/22


 


hydrOXYzine HCL [Atarax] 25 mg PO TID PRN #20 tab 12/01/22


 


predniSONE 50 mg PO DAILY #5 tab 12/01/22











                                    Allergies











Allergy/AdvReac Type Severity Reaction Status Date / Time


 


hydromorphone [From Dilaudid] Allergy  Anaphylaxis Verified 12/01/22 12:22














Review of Systems


ROS Statement: 


Those systems with pertinent positive or pertinent negative responses have been 

documented in the HPI.





ROS Other: All systems not noted in ROS Statement are negative.





Past Medical History


Past Medical History: Hyperlipidemia, Hypertension, Renal Disease


Additional Past Medical History / Comment(s): chronic neck and back pain


History of Any Multi-Drug Resistant Organisms: None Reported


Additional Past Surgical History / Comment(s): kidney removed


Past Psychological History: No Psychological Hx Reported


Smoking Status: Current every day smoker


Past Alcohol Use History: Occasional


Past Drug Use History: None Reported





General Exam


Limitations: no limitations


General appearance: alert, in no apparent distress


Head exam: Present: atraumatic, normocephalic, normal inspection


Respiratory exam: Present: normal lung sounds bilaterally.  Absent: respiratory 

distress, wheezes, rales, rhonchi, stridor


Cardiovascular Exam: Present: regular rate, normal rhythm, normal heart sounds. 

Absent: systolic murmur, diastolic murmur, rubs, gallop, clicks


Neurological exam: Present: alert, oriented X3, CN II-XII intact


Psychiatric exam: Present: normal affect, normal mood


Skin exam: Present: warm, dry, intact, normal color, rash (Diffuse rash over the

upper and lower back, and to a lesser extent, lower extremities.  Several pa

pules on erythematous base.)





Course


                                   Vital Signs











  12/01/22 12/01/22





  12:19 13:44


 


Temperature 98.2 F 


 


Pulse Rate 72 75


 


Respiratory 16 18





Rate  


 


Blood Pressure 187/90 138/97


 


O2 Sat by Pulse 96 95





Oximetry  














Medical Decision Making





- Medical Decision Making


This is a 67-year-old presenting with dermatitis.  It is unknown if patient is 

presenting with some type of allergic reaction however patient is very adamant 

that this is a scabies infection.  Patient has appointment with his derma

tologist tomorrow.  Will treat for scabies infection with instruction to follow-

up as planned.








Dr. Atkinson is my attending.








Disposition


Clinical Impression: 


 Dermatitis, Pruritic rash





Disposition: HOME SELF-CARE


Condition: Good


Instructions (If sedation given, give patient instructions):  Contact Dermatitis

(ED), Scabies (ED)


Additional Instructions: 


Take medication as directed.  Do not drink alcohol or operate machinery while 

taking hydroxyzine as it can make sleepy.  Follow-up with dermatology tomorrow 

as planned.  Return to the emergency department if you experience new, 

concerning, or worsening symptoms.


Prescriptions: 


hydrOXYzine HCL [Atarax] 25 mg PO TID PRN #20 tab


 PRN Reason: Allergic Reaction


Permethrin 5% Cream [Elimite] 1 applic TOPICAL ONCE #60 gram


predniSONE 50 mg PO DAILY #5 tab


Is patient prescribed a controlled substance at d/c from ED?: No


Referrals: 


[facility] [Primary Care Provider] - 1-2 days

## 2023-01-18 ENCOUNTER — HOSPITAL ENCOUNTER (EMERGENCY)
Dept: HOSPITAL 47 - EC | Age: 68
Discharge: HOME | End: 2023-01-18
Payer: OTHER GOVERNMENT

## 2023-01-18 VITALS — RESPIRATION RATE: 18 BRPM | DIASTOLIC BLOOD PRESSURE: 91 MMHG | SYSTOLIC BLOOD PRESSURE: 169 MMHG | HEART RATE: 61 BPM

## 2023-01-18 VITALS — TEMPERATURE: 98.9 F

## 2023-01-18 DIAGNOSIS — Z79.82: ICD-10-CM

## 2023-01-18 DIAGNOSIS — F17.200: ICD-10-CM

## 2023-01-18 DIAGNOSIS — E78.5: ICD-10-CM

## 2023-01-18 DIAGNOSIS — I10: ICD-10-CM

## 2023-01-18 DIAGNOSIS — R07.89: Primary | ICD-10-CM

## 2023-01-18 DIAGNOSIS — Z79.899: ICD-10-CM

## 2023-01-18 DIAGNOSIS — Z88.5: ICD-10-CM

## 2023-01-18 LAB
ALBUMIN SERPL-MCNC: 4.7 G/DL (ref 3.5–5)
ALP SERPL-CCNC: 55 U/L (ref 38–126)
ALT SERPL-CCNC: 40 U/L (ref 4–49)
ANION GAP SERPL CALC-SCNC: 7 MMOL/L
APTT BLD: 23.6 SEC (ref 22–30)
AST SERPL-CCNC: 32 U/L (ref 17–59)
BASOPHILS # BLD AUTO: 0 K/UL (ref 0–0.2)
BASOPHILS NFR BLD AUTO: 1 %
BUN SERPL-SCNC: 15 MG/DL (ref 9–20)
CALCIUM SPEC-MCNC: 9.2 MG/DL (ref 8.4–10.2)
CHLORIDE SERPL-SCNC: 101 MMOL/L (ref 98–107)
CO2 SERPL-SCNC: 30 MMOL/L (ref 22–30)
EOSINOPHIL # BLD AUTO: 0.2 K/UL (ref 0–0.7)
EOSINOPHIL NFR BLD AUTO: 2 %
ERYTHROCYTE [DISTWIDTH] IN BLOOD BY AUTOMATED COUNT: 4.81 M/UL (ref 4.3–5.9)
ERYTHROCYTE [DISTWIDTH] IN BLOOD: 11.6 % (ref 11.5–15.5)
GLUCOSE SERPL-MCNC: 106 MG/DL (ref 74–99)
HCT VFR BLD AUTO: 44.2 % (ref 39–53)
HGB BLD-MCNC: 15.4 GM/DL (ref 13–17.5)
INR PPP: 1 (ref ?–1.2)
LYMPHOCYTES # SPEC AUTO: 2.1 K/UL (ref 1–4.8)
LYMPHOCYTES NFR SPEC AUTO: 31 %
MAGNESIUM SPEC-SCNC: 2.1 MG/DL (ref 1.6–2.3)
MCH RBC QN AUTO: 32.1 PG (ref 25–35)
MCHC RBC AUTO-ENTMCNC: 35 G/DL (ref 31–37)
MCV RBC AUTO: 91.9 FL (ref 80–100)
MONOCYTES # BLD AUTO: 0.4 K/UL (ref 0–1)
MONOCYTES NFR BLD AUTO: 6 %
NEUTROPHILS # BLD AUTO: 4.1 K/UL (ref 1.3–7.7)
NEUTROPHILS NFR BLD AUTO: 59 %
PLATELET # BLD AUTO: 240 K/UL (ref 150–450)
POTASSIUM SERPL-SCNC: 4.3 MMOL/L (ref 3.5–5.1)
PROT SERPL-MCNC: 7.7 G/DL (ref 6.3–8.2)
PT BLD: 10.5 SEC (ref 9–12)
SODIUM SERPL-SCNC: 138 MMOL/L (ref 137–145)
WBC # BLD AUTO: 7 K/UL (ref 3.8–10.6)

## 2023-01-18 PROCEDURE — 71046 X-RAY EXAM CHEST 2 VIEWS: CPT

## 2023-01-18 PROCEDURE — 83880 ASSAY OF NATRIURETIC PEPTIDE: CPT

## 2023-01-18 PROCEDURE — 85379 FIBRIN DEGRADATION QUANT: CPT

## 2023-01-18 PROCEDURE — 84484 ASSAY OF TROPONIN QUANT: CPT

## 2023-01-18 PROCEDURE — 93005 ELECTROCARDIOGRAM TRACING: CPT

## 2023-01-18 PROCEDURE — 85025 COMPLETE CBC W/AUTO DIFF WBC: CPT

## 2023-01-18 PROCEDURE — 36415 COLL VENOUS BLD VENIPUNCTURE: CPT

## 2023-01-18 PROCEDURE — 83735 ASSAY OF MAGNESIUM: CPT

## 2023-01-18 PROCEDURE — 80053 COMPREHEN METABOLIC PANEL: CPT

## 2023-01-18 PROCEDURE — 85730 THROMBOPLASTIN TIME PARTIAL: CPT

## 2023-01-18 PROCEDURE — 99285 EMERGENCY DEPT VISIT HI MDM: CPT

## 2023-01-18 PROCEDURE — 85610 PROTHROMBIN TIME: CPT

## 2023-01-18 NOTE — ED
Chest Pain HPI





- General


Chief Complaint: Chest Pain


Stated Complaint: chest pain, High BP


Time Seen by Provider: 01/18/23 09:37


Source: patient


Mode of arrival: ambulatory


Limitations: no limitations





- History of Present Illness


Initial Comments: 





Patient complains of sharp chest pain.  The pain is on the left side of the 

chest.  Nothing makes it better or worse.  He has taken no medicine for.  He 

wasn't doing anything when it began.





- Related Data


                                Home Medications











 Medication  Instructions  Recorded  Confirmed


 


Albuterol Inhaler [Ventolin Hfa 1 puff INHALATION RT-QID PRN 01/18/23 01/18/23





Inhaler]   


 


Aspirin EC [Ecotrin Low Dose] 81 mg PO DAILY 01/18/23 01/18/23


 


Atorvastatin [Lipitor] 20 mg PO HS 01/18/23 01/18/23


 


Fluticasone Propion/Salmeterol 1 puff INHALATION RT-BID 01/18/23 01/18/23





[Fluticasone-Salmeterol 100-50]   


 


HYDROcodone/APAP 10-325MG [Norco 1 tab PO QID PRN 01/18/23 01/18/23





]   


 


Triamcinolone 0.1% Cream [Kenalog 1 applicatio TOPICAL BID PRN 01/18/23 01/18/23





0.1% Cream]   


 


lisinopriL [Zestril] 5 mg PO BID 01/18/23 01/18/23








                                  Previous Rx's











 Medication  Instructions  Recorded


 


hydrOXYzine HCL [Atarax] 25 mg PO TID PRN 14 Days #42 tab 03/29/22











                                    Allergies











Allergy/AdvReac Type Severity Reaction Status Date / Time


 


hydromorphone [From Dilaudid] Allergy  Anaphylaxis Verified 01/18/23 10:57














Review of Systems


ROS Statement: 


Those systems with pertinent positive or pertinent negative responses have been 

documented in the HPI.





ROS Other: All systems not noted in ROS Statement are negative.





EKG Findings





- EKG Comments:


EKG Findings:: Twelve-lead EKG shows ventricular rate 73 bpm, normal [place] interval

and QRS complex, no ST elevation or depression, interpreted by me as normal 

sinus rhythm.





Past Medical History


Past Medical History: Hyperlipidemia, Hypertension, Renal Disease


Additional Past Medical History / Comment(s): chronic neck and back pain


History of Any Multi-Drug Resistant Organisms: None Reported


Additional Past Surgical History / Comment(s): kidney removed


Past Psychological History: No Psychological Hx Reported


Smoking Status: Current every day smoker


Past Alcohol Use History: Occasional


Past Drug Use History: None Reported





General Exam


Limitations: no limitations


General appearance: alert, in no apparent distress


Head exam: Present: atraumatic, normocephalic, normal inspection


Eye exam: Present: normal appearance, PERRL, EOMI.  Absent: scleral icterus, 

conjunctival injection, periorbital swelling


ENT exam: Present: normal exam, mucous membranes moist


Neck exam: Present: normal inspection.  Absent: tenderness, meningismus, 

lymphadenopathy


Respiratory exam: Present: normal lung sounds bilaterally.  Absent: respiratory 

distress, wheezes, rales, rhonchi, stridor


Cardiovascular Exam: Present: regular rate, normal rhythm, normal heart sounds. 

Absent: systolic murmur, diastolic murmur, rubs, gallop, clicks


GI/Abdominal exam: Present: soft, normal bowel sounds.  Absent: distended, 

tenderness, guarding, rebound, rigid


Extremities exam: Present: normal inspection, full ROM, normal capillary refill.

 Absent: tenderness, pedal edema, joint swelling, calf tenderness


Back exam: Present: normal inspection


Neurological exam: Present: alert, oriented X3, CN II-XII intact


Psychiatric exam: Present: normal affect, normal mood


Skin exam: Present: warm, dry, intact, normal color.  Absent: rash





Course





                                   Vital Signs











  01/18/23 01/18/23





  09:34 09:58


 


Temperature 97.9 F 98.9 F


 


Pulse Rate 84 66


 


Respiratory 20 16





Rate  


 


Blood Pressure 180/100 


 


O2 Sat by Pulse 97 96





Oximetry  














Chest Pain MDM





- Core Measures


AMI Core Measures Followed: Yes





- [facility]





Patient presents with chest pain.  I'm concerned for acute coronary syndrome.  

Patient has no neurological symptoms.  I have less concern for dissection or 

aneurysm.  I considered a CT, however the patient's history and physical are not

consistent with that.





1 view chest x-ray independently reviewed and read by me shows no pneumonia or 

pneumothorax.





Laboratory studies are interpreted by me showing normal electrolytes and CBC.





2 serial troponins are both negative.








I discussed the results the patient.  I considered admission, however given the 

serial negative troponins and O believe it is indicated at this time.  We have 

ruled out acute emergency.  He is stable for discharge.





Disposition


Clinical Impression: 


 Chest pain





Disposition: HOME SELF-CARE


Condition: Good


Instructions (If sedation given, give patient instructions):  Chest Pain (ED)


Is patient prescribed a controlled substance at d/c from ED?: No


Referrals: 


[facility] [Primary Care Provider] - 1-2 days

## 2023-01-18 NOTE — XR
EXAMINATION TYPE: XR chest 2V

 

DATE OF EXAM: 1/18/2023 10:05 AM

 

COMPARISON: Chest radiographs from 12/11/2017

 

TECHNIQUE: XR chest 2V Frontal and lateral views of the chest.

 

CLINICAL INDICATION:Male, 67 years old with history of Chest Pain; 

 

FINDINGS: 

Lungs/Pleura: There is flattening of the diaphragm with increased lucency of the lungs. No evidence o
f pneumothorax, pleural effusion or focal consolidation. 

Pulmonary vascularity: Unremarkable.

Heart/mediastinum: Cardiomediastinal silhouette is unremarkable.

Musculoskeletal: No acute osseous pathology.

 

IMPRESSION: 

1. No acute cardiopulmonary disease process.

 

2. COPD changes.

## 2024-02-16 ENCOUNTER — HOSPITAL ENCOUNTER (OUTPATIENT)
Dept: HOSPITAL 47 - RADCTMAIN | Age: 69
Discharge: HOME | End: 2024-02-16
Attending: INTERNAL MEDICINE
Payer: OTHER GOVERNMENT

## 2024-02-16 DIAGNOSIS — J43.2: ICD-10-CM

## 2024-02-16 DIAGNOSIS — Z12.2: Primary | ICD-10-CM

## 2024-02-16 DIAGNOSIS — F17.210: ICD-10-CM

## 2024-02-16 PROCEDURE — 71271 CT THORAX LUNG CANCER SCR C-: CPT

## 2024-02-16 NOTE — CTL
EXAMINATION TYPE: CT Low Dose Lung

 

DATE OF EXAM: 2/16/2024 7:55 AM

 

CLINICAL INDICATION:Male, 68 years old with history of F17.210 NICOTINE DEPENDENCE, CIGARETTES, UN; N
icotine dependence, cigarettes, uncomplic , history of tobacco use.

 

COMPARISON: None.

 

TECHNIQUE: Multiple axial non-contrast scans were obtained from approximately the lung apices through
 the upper abdomen. Coronal and sagittal reformatted images were obtained. Low dose technique was uti
lized. 

CT DLP: 83.39 mGycm, Automated exposure control for dose reduction was used.

CT Contrast:

Contrast used: None

Oral contrast used: None

 

FINDINGS:

========

Lack of intravenous contrast and low dose technique limits the evaluation of the vascular and soft ti
ssue structures.

 

LUNGS: No evidence of pulmonary fibrosis. No evidence of focal consolidation, pneumothorax or pleural
 effusion. Mild centrilobular emphysema changes are present.

 

Nodules: 

 

    RUL: None.

    RML: None.

    RLL: None.

    ROSA: None.

    LLL: None.

 

AIRWAY: Patent and unremarkable.

HEART: Size within normal limits. Scattered ulcerations of the coronary arteries.

MEDIASTINUM: No gross evidence of adenopathy.

VASCULATURE:  No aortic aneurysm. 

MUSCULOSKELETAL: Mild disc degeneration changes are present throughout the thoracolumbar spine.

SOFT TISSUES/LYMPH NODES: Unremarkable.

LOWER NECK: No significant findings.

UPPER ABDOMEN: Partially visualized surgical clips in the right renal fossa.

 

IMPRESSION:

1. No pulmonary nodules.

 

2. Mild emphysema.

 

CT LUNG RAD AND CT CHEST RECOMMENDATION: Lung-Rad 1 Negative: Continue annual screening with LDCT in 
12 months.

 

S Modifier (other clinically significant findings): None

 

Recommend smoking cessation (if current smoker), or continuation of smoking cessation (if prior smoke
r). Annual screening for lung cancer with low-dose computed tomography is recommended in adults ages 
55 to 77 years who have a 30 pack-year smoking history and currently smoke or have quit within the pa
st 15 years. Screening should be discontinued once a person has not smoked for 15 years or develops a
 health problem that substantially limits life expectancy or the ability or willingness to have curat
jatinder lung surgery.

 

Lung rads 2022

[email or web address]

## 2025-03-28 ENCOUNTER — HOSPITAL ENCOUNTER (OUTPATIENT)
Dept: HOSPITAL 47 - RADCTMAIN | Age: 70
Discharge: HOME | End: 2025-03-28
Attending: INTERNAL MEDICINE
Payer: OTHER GOVERNMENT

## 2025-03-28 DIAGNOSIS — F17.210: ICD-10-CM

## 2025-03-28 DIAGNOSIS — J44.9: ICD-10-CM

## 2025-03-28 DIAGNOSIS — Z12.2: Primary | ICD-10-CM

## 2025-03-28 PROCEDURE — 71271 CT THORAX LUNG CANCER SCR C-: CPT

## 2025-03-28 NOTE — CTL
EXAMINATION TYPE:  CT Low Dose Lung

 

DATE OF EXAM ORDERED: 3/28/2025

 

COMPARISON: Low-dose lung scarring CT February 16, 2024 and older studies

 

CLINICAL INDICATION: Male, 69 years old with history of Lung cancer screening; [facility], CURRENT SMOKER 1 
PPD X50 YEARS., Lung cancer screening, History of Smoking/tobacco use.

 

TECHNIQUE: Low dose computed tomography scan was performed through the chest at 1 mm thick sections a
nd reconstructed images in multiple planes at 1 mm and 5 mm thick sections.

CT DLP: 115.5 mGycm

CT CTDI: 3.0 mGy

Automated exposure control for dose reduction was used.

CT DIAGNOSTIC QUALITY: Satisfactory

 

FINDINGS:

 

Nodules: A few tiny nodules are redemonstrated. For reference a stable 2 to 3 mm peripheral anterior 
right upper lobe nodule axial image 93. No new greater then 4 mm pulmonary nodules 

 

LUNGS:

COPD: Severity: Mild to moderate

Fibrosis: Severity: None

Lymph nodes: None

Other findings: None

 

RIGHT PLEURAL SPACE:

Effusion: None

Calcification: None

Thickening: None

Pneumothorax: None

 

LEFT PLEURAL SPACE:

Effusion: None

Calcification: None

Thickening: None

Pneumothorax: None

 

HEART:  

Heart Size: Normal

Coronary Calcification:  Severe

Pericardial Effusion: None 

 

OTHER FINDINGS:

Upper abdomen: Surgical changes likely from right nephrectomy are partially imaged.

Bony thorax: None

Supraclavicular region: None

Other: None

 

IMPRESSION: No suspicious new or enlarging nodules.

 

CT LUNG RAD AND CT CHEST RECOMMENDATION: Lung-Rad 2 Benign Appearance or Behavior: Continue annual sc
reening with LDCT in 12 months.

 

S Modifier (other clinically significant findings): S

Severe three-vessel coronary artery calcification redemonstrated and should be correlated with additi
onal cardiac risk factors.

 

 

 

[facility], Workstation: [id number], 3/28/2025 12:16 PM

## 2025-04-07 ENCOUNTER — HOSPITAL ENCOUNTER (OUTPATIENT)
Dept: HOSPITAL 47 - LABWHC1 | Age: 70
Discharge: HOME | End: 2025-04-07
Attending: INTERNAL MEDICINE
Payer: OTHER GOVERNMENT

## 2025-04-07 DIAGNOSIS — R06.02: Primary | ICD-10-CM

## 2025-04-07 LAB
BUN SERPL-SCNC: 10.4 MG/DL (ref 9–27)
BUN/CREAT SERPL: 11.56 RATIO (ref 12–20)
CALCIUM SPEC-MCNC: 9.7 MG/DL (ref 8.7–10.3)
CHLORIDE SERPL-SCNC: 98 MMOL/L (ref 96–109)
CO2 SERPL-SCNC: 27.1 MMOL/L (ref 21.6–31.8)
ERYTHROCYTE [DISTWIDTH] IN BLOOD BY AUTOMATED COUNT: 4.78 X 10*6/UL (ref 4.4–5.6)
GLUCOSE SERPL-MCNC: 88 MG/DL (ref 70–110)
MCH RBC QN AUTO: 31.4 PG (ref 27–32)
MCHC RBC AUTO-ENTMCNC: 33.3 G/DL (ref 32–37)
MCV RBC AUTO: 94.1 FL (ref 80–97)
NRBC BLD AUTO-RTO: 0 X 10*3/UL (ref 0–0.01)
PLATELET # BLD AUTO: 301 X 10*3/UL (ref 140–440)
SODIUM SERPL-SCNC: 136 MMOL/L (ref 135–145)
WBC # BLD AUTO: 10.51 X 10*3/UL (ref 4.5–10)

## 2025-04-07 PROCEDURE — 36415 COLL VENOUS BLD VENIPUNCTURE: CPT

## 2025-04-07 PROCEDURE — 80048 BASIC METABOLIC PNL TOTAL CA: CPT

## 2025-04-07 PROCEDURE — 85027 COMPLETE CBC AUTOMATED: CPT

## 2025-04-29 ENCOUNTER — HOSPITAL ENCOUNTER (OUTPATIENT)
Dept: HOSPITAL 47 - CATHCVL | Age: 70
Discharge: HOME | End: 2025-04-29
Attending: INTERNAL MEDICINE
Payer: OTHER GOVERNMENT

## 2025-04-29 VITALS — HEART RATE: 56 BPM | RESPIRATION RATE: 18 BRPM

## 2025-04-29 VITALS — TEMPERATURE: 97.5 F

## 2025-04-29 VITALS — SYSTOLIC BLOOD PRESSURE: 124 MMHG | DIASTOLIC BLOOD PRESSURE: 68 MMHG

## 2025-04-29 DIAGNOSIS — Z88.5: ICD-10-CM

## 2025-04-29 DIAGNOSIS — Z72.0: ICD-10-CM

## 2025-04-29 DIAGNOSIS — I10: ICD-10-CM

## 2025-04-29 DIAGNOSIS — Z79.02: ICD-10-CM

## 2025-04-29 DIAGNOSIS — I25.10: Primary | ICD-10-CM

## 2025-04-29 DIAGNOSIS — Z79.82: ICD-10-CM

## 2025-04-29 DIAGNOSIS — E78.2: ICD-10-CM

## 2025-04-29 DIAGNOSIS — Z79.899: ICD-10-CM

## 2025-04-29 PROCEDURE — 99152 MOD SED SAME PHYS/QHP 5/>YRS: CPT

## 2025-04-29 PROCEDURE — 93458 L HRT ARTERY/VENTRICLE ANGIO: CPT

## 2025-04-29 RX ADMIN — VERAPAMIL HYDROCHLORIDE ONE ML: 2.5 INJECTION, SOLUTION INTRAVENOUS at 07:34

## 2025-04-29 RX ADMIN — LIDOCAINE HYDROCHLORIDE ONE ML: 10 INJECTION, SOLUTION INFILTRATION; PERINEURAL at 07:31

## 2025-04-29 RX ADMIN — ATORVASTATIN CALCIUM STA: 80 TABLET, FILM COATED ORAL at 07:02

## 2025-04-29 RX ADMIN — CEFAZOLIN SCH MLS/HR: 330 INJECTION, POWDER, FOR SOLUTION INTRAMUSCULAR; INTRAVENOUS at 07:02

## 2025-04-29 RX ADMIN — CEFAZOLIN PRN MLS: 330 INJECTION, POWDER, FOR SOLUTION INTRAMUSCULAR; INTRAVENOUS at 07:21

## 2025-04-29 RX ADMIN — ASPIRIN 325 MG ORAL TABLET STA: 325 PILL ORAL at 07:03

## 2025-04-29 RX ADMIN — IOPAMIDOL ONE ML: 755 INJECTION, SOLUTION INTRAVENOUS at 07:41

## 2025-04-29 RX ADMIN — METHYLENE BLUE PRN MLS: 10 INJECTION INTRAVENOUS at 07:22

## 2025-04-29 RX ADMIN — HEPARIN SODIUM ONE UNIT: 1000 INJECTION INTRAVENOUS; SUBCUTANEOUS at 07:35

## 2025-04-29 RX ADMIN — MIDAZOLAM ONE MG: 1 INJECTION INTRAMUSCULAR; INTRAVENOUS at 07:28

## 2025-04-29 RX ADMIN — POTASSIUM CHLORIDE ONE MLS: 14.9 INJECTION, SOLUTION INTRAVENOUS at 07:03
